# Patient Record
Sex: FEMALE | Race: WHITE | Employment: FULL TIME | URBAN - METROPOLITAN AREA
[De-identification: names, ages, dates, MRNs, and addresses within clinical notes are randomized per-mention and may not be internally consistent; named-entity substitution may affect disease eponyms.]

---

## 2022-06-09 ENCOUNTER — TELEPHONE (OUTPATIENT)
Dept: PULMONOLOGY | Facility: CLINIC | Age: 72
End: 2022-06-09

## 2022-06-09 ENCOUNTER — OFFICE VISIT (OUTPATIENT)
Dept: PULMONOLOGY | Facility: CLINIC | Age: 72
End: 2022-06-09
Payer: MEDICARE

## 2022-06-09 VITALS
WEIGHT: 163 LBS | HEART RATE: 86 BPM | OXYGEN SATURATION: 97 % | SYSTOLIC BLOOD PRESSURE: 128 MMHG | BODY MASS INDEX: 26 KG/M2 | DIASTOLIC BLOOD PRESSURE: 78 MMHG

## 2022-06-09 DIAGNOSIS — J84.112 IPF (IDIOPATHIC PULMONARY FIBROSIS) (HCC): Primary | ICD-10-CM

## 2022-06-09 PROBLEM — E89.0 POSTOPERATIVE HYPOTHYROIDISM: Status: ACTIVE | Noted: 2022-06-09

## 2022-06-09 PROBLEM — J84.10 PULMONARY FIBROSIS (HCC): Status: RESOLVED | Noted: 2022-06-09 | Resolved: 2022-06-09

## 2022-06-09 PROBLEM — J84.10 PULMONARY FIBROSIS (HCC): Status: ACTIVE | Noted: 2022-06-09

## 2022-06-09 RX ORDER — TRAMADOL HYDROCHLORIDE 50 MG/1
2 TABLET ORAL 2 TIMES DAILY PRN
COMMUNITY
Start: 2022-05-02

## 2022-06-09 RX ORDER — BUPROPION HYDROCHLORIDE 150 MG/1
150 TABLET ORAL DAILY
COMMUNITY
Start: 2022-01-12

## 2022-06-09 RX ORDER — CELECOXIB 200 MG/1
1 CAPSULE ORAL 2 TIMES DAILY PRN
COMMUNITY
Start: 2022-01-12

## 2022-06-09 NOTE — TELEPHONE ENCOUNTER
RN: The patient needs consult appt with Dr. kSip Carmen this month. I discussed with him - okay to add. Can you please facilitate?

## 2022-06-10 ENCOUNTER — PATIENT MESSAGE (OUTPATIENT)
Dept: PULMONOLOGY | Facility: CLINIC | Age: 72
End: 2022-06-10

## 2022-06-30 ENCOUNTER — LAB ENCOUNTER (OUTPATIENT)
Dept: LAB | Facility: HOSPITAL | Age: 72
End: 2022-06-30
Attending: INTERNAL MEDICINE
Payer: MEDICARE

## 2022-06-30 ENCOUNTER — TELEPHONE (OUTPATIENT)
Dept: PULMONOLOGY | Facility: CLINIC | Age: 72
End: 2022-06-30

## 2022-06-30 ENCOUNTER — OFFICE VISIT (OUTPATIENT)
Dept: PULMONOLOGY | Facility: CLINIC | Age: 72
End: 2022-06-30
Payer: MEDICARE

## 2022-06-30 VITALS
RESPIRATION RATE: 16 BRPM | HEIGHT: 67 IN | DIASTOLIC BLOOD PRESSURE: 74 MMHG | HEART RATE: 77 BPM | SYSTOLIC BLOOD PRESSURE: 120 MMHG | OXYGEN SATURATION: 98 % | BODY MASS INDEX: 25.74 KG/M2 | WEIGHT: 164 LBS

## 2022-06-30 DIAGNOSIS — J84.112 IDIOPATHIC PULMONARY FIBROSIS (HCC): ICD-10-CM

## 2022-06-30 DIAGNOSIS — J84.112 IDIOPATHIC PULMONARY FIBROSIS (HCC): Primary | ICD-10-CM

## 2022-06-30 LAB
ERYTHROCYTE [SEDIMENTATION RATE] IN BLOOD: 37 MM/HR
RHEUMATOID FACT SERPL-ACNC: <10 IU/ML (ref ?–15)

## 2022-06-30 PROCEDURE — 86431 RHEUMATOID FACTOR QUANT: CPT | Performed by: INTERNAL MEDICINE

## 2022-06-30 PROCEDURE — 99205 OFFICE O/P NEW HI 60 MIN: CPT | Performed by: INTERNAL MEDICINE

## 2022-06-30 PROCEDURE — 86606 ASPERGILLUS ANTIBODY: CPT

## 2022-06-30 PROCEDURE — 36415 COLL VENOUS BLD VENIPUNCTURE: CPT

## 2022-06-30 PROCEDURE — 86331 IMMUNODIFFUSION OUCHTERLONY: CPT

## 2022-06-30 PROCEDURE — 86038 ANTINUCLEAR ANTIBODIES: CPT

## 2022-06-30 PROCEDURE — 85652 RBC SED RATE AUTOMATED: CPT | Performed by: INTERNAL MEDICINE

## 2022-06-30 RX ORDER — ALENDRONATE SODIUM 70 MG/1
70 TABLET ORAL WEEKLY
COMMUNITY
Start: 2022-06-27

## 2022-06-30 RX ORDER — LEVOTHYROXINE SODIUM 112 UG/1
TABLET ORAL
COMMUNITY
Start: 2022-05-05

## 2022-06-30 RX ORDER — AMOXICILLIN 250 MG
1-2 CAPSULE ORAL 2 TIMES DAILY PRN
COMMUNITY
Start: 2022-04-16

## 2022-06-30 RX ORDER — ESOMEPRAZOLE MAGNESIUM 40 MG/1
40 FOR SUSPENSION ORAL
Qty: 90 EACH | Refills: 3 | Status: SHIPPED | OUTPATIENT
Start: 2022-06-30

## 2022-06-30 NOTE — TELEPHONE ENCOUNTER
Patient had a consult with Dr. Magnolia Omalley today and was advised to schedule 1 month follow-up. Dr. Magnolia Omalley does not have any openings in that time frame.  Patient declined 8/09 at 4:30pm and 4:45pm.

## 2022-07-01 NOTE — TELEPHONE ENCOUNTER
Spoke with patient Office visit notes state 3 month follow up. Follow up scheduled with Dr. Edwin Pereira on 9/29/22 at 12:30pm.  Verified date, time, location & parking, patient verbalized understanding.

## 2022-07-02 ENCOUNTER — PATIENT MESSAGE (OUTPATIENT)
Dept: PULMONOLOGY | Facility: CLINIC | Age: 72
End: 2022-07-02

## 2022-07-05 LAB — NUCLEAR IGG TITR SER IF: POSITIVE {TITER}

## 2022-07-06 ENCOUNTER — TELEPHONE (OUTPATIENT)
Dept: PULMONOLOGY | Facility: CLINIC | Age: 72
End: 2022-07-06

## 2022-07-06 LAB
ANA NUCLEOLAR TITR SER IF: 160 {TITER}
DSDNA AB TITR SER: <10 {TITER}
ENA SM IGG SER QL: NEGATIVE
ENA SM+RNP AB SER QL: NEGATIVE
ENA SS-A AB SER QL IA: NEGATIVE
ENA SS-B AB SER QL IA: NEGATIVE

## 2022-07-06 RX ORDER — ESOMEPRAZOLE MAGNESIUM 40 MG/1
20 FOR SUSPENSION ORAL 2 TIMES DAILY
Qty: 90 EACH | Refills: 3 | Status: SHIPPED | OUTPATIENT
Start: 2022-07-06 | End: 2022-07-08 | Stop reason: CLARIF

## 2022-07-07 ENCOUNTER — PATIENT MESSAGE (OUTPATIENT)
Dept: PULMONOLOGY | Facility: CLINIC | Age: 72
End: 2022-07-07

## 2022-07-07 ENCOUNTER — TELEPHONE (OUTPATIENT)
Dept: PULMONOLOGY | Facility: CLINIC | Age: 72
End: 2022-07-07

## 2022-07-07 NOTE — TELEPHONE ENCOUNTER
Dr. Rodrigue Browne, patient is requesting Esbriet prescription to be sent to [facility]. States it has already been approved. [id number] [address] for us to send to her preferred pharmacy?

## 2022-07-07 NOTE — TELEPHONE ENCOUNTER
Received medical records from patient ([address]) and CD Placed on DR. Lebron and Emma for review as she has send to both providers.

## 2022-07-08 NOTE — TELEPHONE ENCOUNTER
Dr. Luis Angel Seo, still waiting on Esbriet order to be signed from office visit 6/30/22    Please sign pended orders medication now being send to [facility] as I have changed it.  Thanks

## 2022-07-08 NOTE — TELEPHONE ENCOUNTER
[address] needed clarification on Esomeprazole. Discontinued order sent from 6/30/22 for Esomeprazole 40 mg as insurance will only cover 20 mg BID.      Verified changed with pharmacist.

## 2022-07-14 ENCOUNTER — TELEPHONE (OUTPATIENT)
Dept: PULMONOLOGY | Facility: CLINIC | Age: 72
End: 2022-07-14

## 2022-07-14 NOTE — TELEPHONE ENCOUNTER
Was told by RN to fax [address] even though patient will be going through [address]. Received fconfirmation ax from [facility]. Will sent out for scanning. [facility] Will contact patient with instructions. My chart message sent to patient. Medical Records and Disc are in desk ready for her to . They have been review by Dr. Luis Angel Seo.

## 2022-07-15 ENCOUNTER — TELEPHONE (OUTPATIENT)
Dept: PULMONOLOGY | Facility: CLINIC | Age: 72
End: 2022-07-15

## 2022-07-15 ENCOUNTER — PATIENT MESSAGE (OUTPATIENT)
Dept: PULMONOLOGY | Facility: CLINIC | Age: 72
End: 2022-07-15

## 2022-07-15 NOTE — TELEPHONE ENCOUNTER
Received packet of information on IPF with patient's symptoms that she dropped off at the . Spoke with patient to f/u on dizziness and presyncope. States this occurs when her oxygen levels are in the 80s. She now has portable O2 and uses as needed. Also addressed her concern of pulmonary hypertension and explained [place] [facility] [place] showed normal PA pressure.

## 2022-07-15 NOTE — TELEPHONE ENCOUNTER
From: Jerome Macias  To: Laura Cordero MD  Sent: 7/15/2022 1:47 PM CDT  Subject: Re: Liver Function Tests    I live in [address] [place] [address] and always get my blood work done at [facility] here in [facility]. I just called and asked if a doctor from [place] can order blood work via [address] so I can get it done there.  She said no, so Dr. Nan Zaragoza needs to [facility] the liver blood work he has ordered for me to:  [address]: [phone]   (Phone: [phone])

## 2022-07-25 ENCOUNTER — TELEPHONE (OUTPATIENT)
Dept: PULMONOLOGY | Facility: CLINIC | Age: 72
End: 2022-07-25

## 2022-07-25 NOTE — TELEPHONE ENCOUNTER
Received [facility] radiology written reports and CD containing images for exams 2018 to present via [address] [place] [address]. Records placed in Dr. Shona Birmingham folder for review.

## 2022-07-25 NOTE — TELEPHONE ENCOUNTER
Was transferred to [facility] specialty pharmacy [phone]. Spoke with pharmacist, [place], at [facility] and verified [employer] [facility] with them over the phone. They don't like electronic signature signatures in [place] so needed verbal approval.  Per TE 7/7/22, [employer] was approved and Rx was sent.

## 2022-07-25 NOTE — TELEPHONE ENCOUNTER
Gavin Mo from speciality pharmacy called in  [facility] 10 there was no signature from Dr. Brenda Mcleod on medicine Pirfenidone (ESBRIET) 56 MG Oral Tab and wants to make sure they can proceed with filling the medicine.  Please follow up

## 2022-07-28 ENCOUNTER — PATIENT MESSAGE (OUTPATIENT)
Dept: PULMONOLOGY | Facility: CLINIC | Age: 72
End: 2022-07-28

## 2022-07-28 RX ORDER — ESOMEPRAZOLE MAGNESIUM 40 MG/1
40 FOR SUSPENSION ORAL
Qty: 30 EACH | Refills: 2 | Status: SHIPPED | OUTPATIENT
Start: 2022-07-28

## 2022-07-28 NOTE — TELEPHONE ENCOUNTER
From: Anjum Frances  To: Grant Grimm PA-C  Sent: 7/28/2022 2:47 PM CDT  Subject: esomeprazole 40 mg    Stu Dowell,    I spoke with a pharmacist at [facility] regarding the canceled 40 mg granule order. As you and discussed the study showed the 100 mg daily showed it helped slow the fibrosis. I gave you the study to read. Dr Josue Bacon already was ordering the 20mg before I read the 100mg study. Dr. Galen Ram and I also discussed this and he looked it up and 40mg is the highest dose, so 40mg BID would help me achieve the 100mg daily. I have been taking 5 - 20mg daily for over a month and having no side effects.    Please order 40mg BID X 90 DAYS (180-40mg capsules)

## 2022-08-11 ENCOUNTER — TELEPHONE (OUTPATIENT)
Dept: PULMONOLOGY | Facility: CLINIC | Age: 72
End: 2022-08-11

## 2022-08-11 NOTE — TELEPHONE ENCOUNTER
Per Joshua Shelton, send [address] imaging/CDs to PACS for import. Dropped off at radiology for import to PACS with note to mail back to patient once completed.

## 2022-08-30 ENCOUNTER — TELEPHONE (OUTPATIENT)
Dept: PULMONOLOGY | Facility: CLINIC | Age: 72
End: 2022-08-30

## 2022-08-30 NOTE — TELEPHONE ENCOUNTER
Pharmacy was calling on behalf of the patient to request a lower dosage of esbriet or change in therapy due to patient experiencing vomiting, dry heave, nausea, headache and chills.

## 2022-09-01 NOTE — TELEPHONE ENCOUNTER
Pt stts she started w/ Ofev 1/2022 which was ordered by Dr. Denny Olmos @ [place], but had to d/c 4/2022 d/t nonstop HA, nausea, & vomiting. Stts Dr. Jose Lackey ordered Esbriet 7/2022, she did ok taking 1 tab (267 mg) tid first wk, but vomiting & nausea immediately started when taking 2 tabs tid second wk, she stopped med, waited a wk until better, & as soon as she took 2 tabs of Esbriet she vomited again. Per pt she told pharmacy to put script for Esbriet on hold & has not been taking med x 2 wks. Per pt she can't eat a thing or she vomits w/ Esbriet. Stts bloated right after eating solid food (like pizza) & thinks needs UGI. Instructed pt to d/w PCP &/or Gastroenterologist (Dr. Jory Hammonds). D/w her that certain foods cause bloating. Confirmed pt took Esbriet w/ meals. Provided pt w/ # for [facility] team. Stts she receives med through specialty pharmacy. Explained Esbriet dose is slowly increased to allow time for body to get used to med & will f/u s/p MD's recommendations. She voiced understanding.

## 2022-09-07 RX ORDER — PIRFENIDONE 267 MG/1
1 TABLET, FILM COATED ORAL
COMMUNITY

## 2022-10-09 ENCOUNTER — PATIENT MESSAGE (OUTPATIENT)
Dept: PULMONOLOGY | Facility: CLINIC | Age: 72
End: 2022-10-09

## 2022-10-10 ENCOUNTER — PATIENT MESSAGE (OUTPATIENT)
Dept: PULMONOLOGY | Facility: CLINIC | Age: 72
End: 2022-10-10

## 2022-10-10 RX ORDER — ESOMEPRAZOLE MAGNESIUM 40 MG/1
40 CAPSULE, DELAYED RELEASE ORAL
Qty: 90 CAPSULE | Refills: 3 | Status: SHIPPED | OUTPATIENT
Start: 2022-10-10